# Patient Record
Sex: FEMALE | Race: WHITE | URBAN - NONMETROPOLITAN AREA
[De-identification: names, ages, dates, MRNs, and addresses within clinical notes are randomized per-mention and may not be internally consistent; named-entity substitution may affect disease eponyms.]

---

## 2017-04-11 ENCOUNTER — DOCTOR'S OFFICE (OUTPATIENT)
Dept: URBAN - NONMETROPOLITAN AREA CLINIC 1 | Facility: CLINIC | Age: 57
Setting detail: OPHTHALMOLOGY
End: 2017-04-11
Payer: COMMERCIAL

## 2017-04-11 DIAGNOSIS — H52.13: ICD-10-CM

## 2017-04-11 DIAGNOSIS — H52.4: ICD-10-CM

## 2017-04-11 DIAGNOSIS — H25.013: ICD-10-CM

## 2017-04-11 PROCEDURE — 92310 CONTACT LENS FITTING OU: CPT | Performed by: OPTOMETRIST

## 2017-04-11 PROCEDURE — 92014 COMPRE OPH EXAM EST PT 1/>: CPT | Performed by: OPTOMETRIST

## 2017-04-11 ASSESSMENT — REFRACTION_MANIFEST
OS_VA3: 20/
OD_VA3: 20/
OS_VA1: 20/
OU_VA: 20/
OU_VA: 20/
OD_VA1: 20/
OD_VA2: 20/
OD_VA3: 20/
OS_VA3: 20/
OD_VA1: 20/
OS_VA2: 20/
OS_VA2: 20/
OS_VA1: 20/
OD_VA2: 20/

## 2017-04-11 ASSESSMENT — REFRACTION_CURRENTRX
OD_OVR_VA: 20/
OD_ADD: +2.00
OS_SPHERE: -3.25
OS_OVR_VA: 20/
OD_AXIS: 148
OD_CYLINDER: -0.50
OD_VPRISM_DIRECTION: BF
OD_SPHERE: -2.75
OD_OVR_VA: 20/
OS_AXIS: 180
OS_OVR_VA: 20/
OS_ADD: +2.00
OS_VPRISM_DIRECTION: BF
OS_CYLINDER: 0.00
OS_OVR_VA: 20/
OD_OVR_VA: 20/

## 2017-04-11 ASSESSMENT — VISUAL ACUITY
OD_BCVA: 20/25
OS_BCVA: 20/20-1

## 2017-04-11 ASSESSMENT — CONFRONTATIONAL VISUAL FIELD TEST (CVF)
OS_FINDINGS: FULL
OD_FINDINGS: FULL

## 2017-04-11 ASSESSMENT — REFRACTION_OUTSIDERX
OD_SPHERE: -2.25
OD_AXIS: 135
OS_VA1: 20/20-2
OS_CYLINDER: -0.25
OS_VA3: 20/
OS_AXIS: 030
OU_VA: 20/
OS_SPHERE: -2.75
OD_VA3: 20/
OD_CYLINDER: -0.50
OS_VA2: 20/25+2
OD_ADD: +2.25
OD_VA2: 20/25+2
OD_VA1: 20/20-2
OS_ADD: +2.25

## 2017-04-11 ASSESSMENT — REFRACTION_AUTOREFRACTION
OD_SPHERE: -2.00
OS_AXIS: 28
OD_AXIS: 152
OD_CYLINDER: -0.75
OS_SPHERE: -2.50
OS_CYLINDER: -0.25

## 2017-04-11 ASSESSMENT — SPHEQUIV_DERIVED
OD_SPHEQUIV: -2.375
OS_SPHEQUIV: -2.625

## 2017-04-14 ENCOUNTER — OPTICAL OFFICE (OUTPATIENT)
Dept: URBAN - NONMETROPOLITAN AREA CLINIC 4 | Facility: CLINIC | Age: 57
Setting detail: OPHTHALMOLOGY
End: 2017-04-14
Payer: COMMERCIAL

## 2017-04-14 DIAGNOSIS — H52.223: ICD-10-CM

## 2017-04-14 PROCEDURE — V2020 VISION SVCS FRAMES PURCHASES: HCPCS | Performed by: OPTOMETRIST

## 2017-04-14 PROCEDURE — V2781 PROGRESSIVE LENS PER LENS: HCPCS | Performed by: OPTOMETRIST

## 2017-04-14 PROCEDURE — V2025 EYEGLASSES DELUX FRAMES: HCPCS | Performed by: OPTOMETRIST

## 2017-04-14 PROCEDURE — V2784 LENS POLYCARB OR EQUAL: HCPCS | Performed by: OPTOMETRIST

## 2017-04-14 PROCEDURE — V2203 LENS SPHCYL BIFOCAL 4.00D/.1: HCPCS | Performed by: OPTOMETRIST

## 2019-10-08 ENCOUNTER — DOCTOR'S OFFICE (OUTPATIENT)
Dept: URBAN - NONMETROPOLITAN AREA CLINIC 1 | Facility: CLINIC | Age: 59
Setting detail: OPHTHALMOLOGY
End: 2019-10-08
Payer: COMMERCIAL

## 2019-10-08 DIAGNOSIS — H52.4: ICD-10-CM

## 2019-10-08 DIAGNOSIS — H52.13: ICD-10-CM

## 2019-10-08 DIAGNOSIS — H25.013: ICD-10-CM

## 2019-10-08 PROCEDURE — 92310 CONTACT LENS FITTING OU: CPT | Performed by: OPTOMETRIST

## 2019-10-08 PROCEDURE — 92014 COMPRE OPH EXAM EST PT 1/>: CPT | Performed by: OPTOMETRIST

## 2019-10-08 ASSESSMENT — REFRACTION_MANIFEST
OD_CYLINDER: -0.25
OU_VA: 20/
OS_VA1: 20/
OS_VA2: 20/25+2
OS_CYLINDER: -0.25
OD_VA2: 20/
OS_VA2: 20/
OD_ADD: +2.25
OS_SPHERE: -2.00
OD_VA2: 20/25+2
OS_AXIS: 180
OD_VA1: 20/
OD_SPHERE: -1.50
OD_VA3: 20/
OS_VA3: 20/
OD_AXIS: 135
OS_VA1: 20/20-2
OS_ADD: +2.25
OD_VA3: 20/
OU_VA: 20/
OS_VA3: 20/
OD_VA1: 20/20-2

## 2019-10-08 ASSESSMENT — CONFRONTATIONAL VISUAL FIELD TEST (CVF)
OS_FINDINGS: FULL
OD_FINDINGS: FULL

## 2019-10-08 ASSESSMENT — SPHEQUIV_DERIVED
OS_SPHEQUIV: -2.125
OD_SPHEQUIV: -1.625

## 2019-12-10 ASSESSMENT — REFRACTION_CURRENTRX
OD_SPHERE: -2.25
OS_OVR_VA: 20/
OS_OVR_VA: 20/
OS_AXIS: 042
OD_OVR_VA: 20/
OS_CYLINDER: -0.25
OD_CYLINDER: -0.50
OS_OVR_VA: 20/
OD_OVR_VA: 20/
OD_AXIS: 132
OD_ADD: +2.25
OD_VPRISM_DIRECTION: PROGS
OS_VPRISM_DIRECTION: PROGS
OS_SPHERE: -2.75
OD_OVR_VA: 20/
OS_ADD: +2.25

## 2019-12-10 ASSESSMENT — SPHEQUIV_DERIVED
OS_SPHEQUIV: -2
OD_SPHEQUIV: -1.75

## 2019-12-10 ASSESSMENT — REFRACTION_AUTOREFRACTION
OD_SPHERE: -1.50
OD_AXIS: 128
OS_AXIS: 162
OS_CYLINDER: -0.50
OS_SPHERE: -1.75
OD_CYLINDER: -0.50

## 2019-12-10 ASSESSMENT — VISUAL ACUITY
OD_BCVA: 20/30-1
OS_BCVA: 20/25+1

## 2020-10-09 ENCOUNTER — OPTICAL OFFICE (OUTPATIENT)
Dept: URBAN - NONMETROPOLITAN AREA CLINIC 4 | Facility: CLINIC | Age: 60
Setting detail: OPHTHALMOLOGY
End: 2020-10-09
Payer: COMMERCIAL

## 2020-10-09 ENCOUNTER — DOCTOR'S OFFICE (OUTPATIENT)
Dept: URBAN - NONMETROPOLITAN AREA CLINIC 1 | Facility: CLINIC | Age: 60
Setting detail: OPHTHALMOLOGY
End: 2020-10-09
Payer: COMMERCIAL

## 2020-10-09 DIAGNOSIS — H52.02: ICD-10-CM

## 2020-10-09 DIAGNOSIS — H52.13: ICD-10-CM

## 2020-10-09 DIAGNOSIS — H52.11: ICD-10-CM

## 2020-10-09 DIAGNOSIS — H52.4: ICD-10-CM

## 2020-10-09 PROCEDURE — S0500 DISPOS CONT LENS: HCPCS | Performed by: OPTOMETRIST

## 2020-10-09 PROCEDURE — 92310 CONTACT LENS FITTING OU: CPT | Performed by: OPTOMETRIST

## 2020-10-09 PROCEDURE — 92014 COMPRE OPH EXAM EST PT 1/>: CPT | Performed by: OPTOMETRIST

## 2020-10-09 ASSESSMENT — REFRACTION_CURRENTRX
OD_OVR_VA: 20/
OS_VPRISM_DIRECTION: PROGS
OD_AXIS: 132
OD_ADD: +2.25
OS_ADD: +2.25
OS_AXIS: 042
OD_SPHERE: -2.25
OS_SPHERE: -2.75
OD_VPRISM_DIRECTION: PROGS
OS_CYLINDER: -0.25
OD_CYLINDER: -0.50
OS_OVR_VA: 20/

## 2020-10-09 ASSESSMENT — REFRACTION_MANIFEST
OD_VA1: 20/20-2
OD_CYLINDER: SPH
OS_VA2: 20/25+2
OS_AXIS: 180
OS_ADD: +2.50
OD_ADD: +2.50
OD_VA2: 20/25+2
OD_SPHERE: -1.75
OS_VA1: 20/20-2
OS_CYLINDER: -0.25
OS_SPHERE: -2.25

## 2020-10-09 ASSESSMENT — SPHEQUIV_DERIVED
OD_SPHEQUIV: -2
OS_SPHEQUIV: -2.375
OS_SPHEQUIV: -2.375

## 2020-10-09 ASSESSMENT — REFRACTION_AUTOREFRACTION
OS_SPHERE: -2.25
OS_AXIS: 129
OD_SPHERE: -2.00
OD_CYLINDER: 0.00
OD_AXIS: 180
OS_CYLINDER: -0.25

## 2020-10-09 ASSESSMENT — CONFRONTATIONAL VISUAL FIELD TEST (CVF)
OS_FINDINGS: FULL
OD_FINDINGS: FULL

## 2020-10-09 ASSESSMENT — VISUAL ACUITY
OD_BCVA: 20/30-1
OS_BCVA: 20/25

## 2021-09-28 ENCOUNTER — HOSPITAL ENCOUNTER (EMERGENCY)
Facility: HOSPITAL | Age: 61
Discharge: HOME | End: 2021-09-28
Attending: EMERGENCY MEDICINE
Payer: COMMERCIAL

## 2021-09-28 VITALS
HEIGHT: 69 IN | DIASTOLIC BLOOD PRESSURE: 105 MMHG | TEMPERATURE: 98 F | WEIGHT: 242.29 LBS | OXYGEN SATURATION: 97 % | BODY MASS INDEX: 35.89 KG/M2 | RESPIRATION RATE: 18 BRPM | SYSTOLIC BLOOD PRESSURE: 157 MMHG | HEART RATE: 94 BPM

## 2021-09-28 DIAGNOSIS — Z20.822 CONTACT WITH AND (SUSPECTED) EXPOSURE TO COVID-19: Primary | ICD-10-CM

## 2021-09-28 LAB — SARS-COV-2 RNA RESP QL NAA+PROBE: NEGATIVE

## 2021-09-28 PROCEDURE — 99281 EMR DPT VST MAYX REQ PHY/QHP: CPT | Performed by: EMERGENCY MEDICINE

## 2021-09-28 PROCEDURE — U0005 INFEC AGEN DETEC AMPLI PROBE: HCPCS | Performed by: EMERGENCY MEDICINE

## 2021-09-28 PROCEDURE — U0003 INFECTIOUS AGENT DETECTION BY NUCLEIC ACID (DNA OR RNA); SEVERE ACUTE RESPIRATORY SYNDROME CORONAVIRUS 2 (SARS-COV-2) (CORONAVIRUS DISEASE [COVID-19]), AMPLIFIED PROBE TECHNIQUE, MAKING USE OF HIGH THROUGHPUT TECHNOLOGIES AS DESCRIBED BY CMS-2020-01-R: HCPCS | Performed by: EMERGENCY MEDICINE

## 2021-09-28 PROCEDURE — 99282 EMERGENCY DEPT VISIT SF MDM: CPT

## 2022-02-16 ENCOUNTER — TELEPHONE (OUTPATIENT)
Dept: PREADMISSION TESTING | Facility: HOSPITAL | Age: 62
End: 2022-02-16

## 2022-02-16 VITALS — BODY MASS INDEX: 34.36 KG/M2 | HEIGHT: 69 IN | WEIGHT: 232 LBS

## 2022-02-16 RX ORDER — MULTIVIT-MIN/IRON FUM/FOLIC AC 7.5 MG-4
1 TABLET ORAL DAILY
COMMUNITY

## 2022-02-16 RX ORDER — AMLODIPINE BESYLATE 5 MG/1
5 TABLET ORAL DAILY
COMMUNITY

## 2022-02-16 RX ORDER — ESCITALOPRAM OXALATE 10 MG/1
10 TABLET ORAL DAILY
COMMUNITY

## 2022-02-16 NOTE — PRE-PROCEDURE INSTRUCTIONS
Pre-Surgery Instructions:   Medication Instructions    amLODIPine (NORVASC) 5 mg tablet Instructed patient per Anesthesia Guidelines   Ascorbic Acid (VITAMIN C ER PO) Instructed patient per Anesthesia Guidelines   escitalopram (LEXAPRO) 10 mg tablet Instructed patient per Anesthesia Guidelines   Misc Natural Products (GLUCOSAMINE CHOND CMP ADVANCED PO) Instructed patient per Anesthesia Guidelines   Multiple Vitamins-Minerals (multivitamin with minerals) tablet Instructed patient per Anesthesia Guidelines   Omega-3 Fatty Acids (FISH OIL BURP-LESS PO) Instructed patient per Anesthesia Guidelines  Pt was instructed to stop all vitamins/supplements from today 2/16 until after procedure    Pt will be taking amlodipine and lexapro the am of procedure

## 2022-02-17 ENCOUNTER — TELEPHONE (OUTPATIENT)
Dept: SURGERY | Facility: HOSPITAL | Age: 62
End: 2022-02-17

## 2022-02-18 ENCOUNTER — ANESTHESIA EVENT (OUTPATIENT)
Dept: GASTROENTEROLOGY | Facility: HOSPITAL | Age: 62
End: 2022-02-18

## 2022-02-18 ENCOUNTER — HOSPITAL ENCOUNTER (OUTPATIENT)
Dept: GASTROENTEROLOGY | Facility: HOSPITAL | Age: 62
Setting detail: OUTPATIENT SURGERY
Discharge: HOME | End: 2022-02-18
Attending: INTERNAL MEDICINE
Payer: COMMERCIAL

## 2022-02-18 ENCOUNTER — ANESTHESIA (OUTPATIENT)
Dept: GASTROENTEROLOGY | Facility: HOSPITAL | Age: 62
End: 2022-02-18

## 2022-02-18 VITALS
HEART RATE: 67 BPM | RESPIRATION RATE: 20 BRPM | HEIGHT: 69 IN | DIASTOLIC BLOOD PRESSURE: 82 MMHG | WEIGHT: 232 LBS | TEMPERATURE: 97.9 F | OXYGEN SATURATION: 100 % | SYSTOLIC BLOOD PRESSURE: 157 MMHG | BODY MASS INDEX: 34.36 KG/M2

## 2022-02-18 DIAGNOSIS — Z12.11 ENCOUNTER FOR SCREENING FOR MALIGNANT NEOPLASM OF COLON: ICD-10-CM

## 2022-02-18 PROCEDURE — 88305 TISSUE EXAM BY PATHOLOGIST: CPT | Performed by: PATHOLOGY

## 2022-02-18 RX ORDER — LIDOCAINE HYDROCHLORIDE 10 MG/ML
INJECTION, SOLUTION EPIDURAL; INFILTRATION; INTRACAUDAL; PERINEURAL AS NEEDED
Status: DISCONTINUED | OUTPATIENT
Start: 2022-02-18 | End: 2022-02-18

## 2022-02-18 RX ORDER — ONDANSETRON 2 MG/ML
4 INJECTION INTRAMUSCULAR; INTRAVENOUS ONCE AS NEEDED
Status: CANCELLED | OUTPATIENT
Start: 2022-02-18

## 2022-02-18 RX ORDER — PROPOFOL 10 MG/ML
INJECTION, EMULSION INTRAVENOUS CONTINUOUS PRN
Status: DISCONTINUED | OUTPATIENT
Start: 2022-02-18 | End: 2022-02-18

## 2022-02-18 RX ORDER — LIDOCAINE HYDROCHLORIDE 10 MG/ML
0.5 INJECTION, SOLUTION EPIDURAL; INFILTRATION; INTRACAUDAL; PERINEURAL ONCE AS NEEDED
Status: DISCONTINUED | OUTPATIENT
Start: 2022-02-18 | End: 2022-02-22 | Stop reason: HOSPADM

## 2022-02-18 RX ORDER — SODIUM CHLORIDE, SODIUM LACTATE, POTASSIUM CHLORIDE, CALCIUM CHLORIDE 600; 310; 30; 20 MG/100ML; MG/100ML; MG/100ML; MG/100ML
125 INJECTION, SOLUTION INTRAVENOUS CONTINUOUS
Status: DISCONTINUED | OUTPATIENT
Start: 2022-02-18 | End: 2022-02-22 | Stop reason: HOSPADM

## 2022-02-18 RX ORDER — DIPHENHYDRAMINE HYDROCHLORIDE 50 MG/ML
12.5 INJECTION INTRAMUSCULAR; INTRAVENOUS ONCE AS NEEDED
Status: CANCELLED | OUTPATIENT
Start: 2022-02-18

## 2022-02-18 RX ORDER — METOCLOPRAMIDE HYDROCHLORIDE 5 MG/ML
10 INJECTION INTRAMUSCULAR; INTRAVENOUS ONCE AS NEEDED
Status: CANCELLED | OUTPATIENT
Start: 2022-02-18

## 2022-02-18 RX ORDER — PROPOFOL 10 MG/ML
INJECTION, EMULSION INTRAVENOUS AS NEEDED
Status: DISCONTINUED | OUTPATIENT
Start: 2022-02-18 | End: 2022-02-18

## 2022-02-18 RX ORDER — GLYCOPYRROLATE 0.2 MG/ML
INJECTION INTRAMUSCULAR; INTRAVENOUS AS NEEDED
Status: DISCONTINUED | OUTPATIENT
Start: 2022-02-18 | End: 2022-02-18

## 2022-02-18 RX ORDER — LABETALOL 20 MG/4 ML (5 MG/ML) INTRAVENOUS SYRINGE
AS NEEDED
Status: DISCONTINUED | OUTPATIENT
Start: 2022-02-18 | End: 2022-02-18

## 2022-02-18 RX ADMIN — GLYCOPYRROLATE 0.4 MG: 0.2 INJECTION, SOLUTION INTRAMUSCULAR; INTRAVENOUS at 11:11

## 2022-02-18 RX ADMIN — PROPOFOL 120 MCG/KG/MIN: 10 INJECTION, EMULSION INTRAVENOUS at 11:05

## 2022-02-18 RX ADMIN — PROPOFOL 30 MG: 10 INJECTION, EMULSION INTRAVENOUS at 11:05

## 2022-02-18 RX ADMIN — LABETALOL HYDROCHLORIDE 5 MG: 5 INJECTION, SOLUTION INTRAVENOUS at 11:16

## 2022-02-18 RX ADMIN — SODIUM CHLORIDE, SODIUM LACTATE, POTASSIUM CHLORIDE, AND CALCIUM CHLORIDE: .6; .31; .03; .02 INJECTION, SOLUTION INTRAVENOUS at 10:56

## 2022-02-18 RX ADMIN — LABETALOL HYDROCHLORIDE 5 MG: 5 INJECTION, SOLUTION INTRAVENOUS at 11:36

## 2022-02-18 RX ADMIN — PROPOFOL 50 MG: 10 INJECTION, EMULSION INTRAVENOUS at 11:03

## 2022-02-18 RX ADMIN — LABETALOL HYDROCHLORIDE 5 MG: 5 INJECTION, SOLUTION INTRAVENOUS at 11:22

## 2022-02-18 RX ADMIN — LIDOCAINE HYDROCHLORIDE 50 MG: 10 INJECTION, SOLUTION EPIDURAL; INFILTRATION; INTRACAUDAL; PERINEURAL at 11:03

## 2022-02-18 RX ADMIN — LABETALOL HYDROCHLORIDE 5 MG: 5 INJECTION, SOLUTION INTRAVENOUS at 11:28

## 2022-02-18 NOTE — ANESTHESIA PREPROCEDURE EVALUATION
Procedure:  COLONOSCOPY    Relevant Problems   ANESTHESIA (within normal limits)      CARDIO   (+) Hypertension      ENDO (within normal limits)      GI/HEPATIC (within normal limits)      /RENAL (within normal limits)      GYN (within normal limits)      HEMATOLOGY (within normal limits)      MUSCULOSKELETAL (within normal limits)      NEURO/PSYCH   (+) Anxiety      PULMONARY   (+) Sleep apnea      Other   (+) Encounter for screening colonoscopy        Physical Exam    Airway    Mallampati score: II  TM Distance: >3 FB  Neck ROM: full     Dental   No notable dental hx     Cardiovascular  Rhythm: regular, Rate: normal, Cardiovascular exam normal    Pulmonary  Pulmonary exam normal Breath sounds clear to auscultation,     Other Findings        Anesthesia Plan  ASA Score- 2     Anesthesia Type- IV sedation with anesthesia with ASA Monitors  Additional Monitors:   Airway Plan:           Plan Factors-    Chart reviewed  Existing labs reviewed  Patient summary reviewed  Induction- intravenous  Postoperative Plan-     Informed Consent- Anesthetic plan and risks discussed with patient  I personally reviewed this patient with the CRNA  Discussed and agreed on the Anesthesia Plan with the NAKUL Collier

## 2022-02-18 NOTE — H&P
History and Physical - [facility] at [address] [place] [address] 62 y o  female MRN: [id number]                  HPI: Candida Mancuso is a 62y o  year old female who presents for colonoscopy for colon cancer screening  This is patient's index colonoscopy  She is at average risk for colon cancer  She denies any lower GI symptoms  She is not on any blood thinning agents  REVIEW OF SYSTEMS: Per the HPI, and otherwise unremarkable  Historical Information   Past Medical History:   Diagnosis Date    Anxiety     Encounter for screening colonoscopy     Hypertension     Sleep apnea     Wears contact lenses      Past Surgical History:   Procedure Laterality Date    HYSTERECTOMY      OVARIAN CYST REMOVAL      TONSILLECTOMY       Social History   Social History     Substance and Sexual Activity   Alcohol Use Yes    Comment: socially/weekly     Social History     Substance and Sexual Activity   Drug Use Never     Social History     Tobacco Use   Smoking Status Former Smoker    Types: Cigarettes   Smokeless Tobacco Never Used   Tobacco Comment    Pt smoked in college     No family history on file  Meds/Allergies     (Not in a hospital admission)      Allergies   Allergen Reactions    Nickel Itching     Pt states it is from jewelry  Objective     Blood pressure 145/87, pulse 72, temperature 97 8 °F (36 6 °C), temperature source Oral, resp  rate 20, height 5' 9" (1 753 m), weight 105 kg (232 lb), SpO2 97 %  PHYSICAL EXAM    Gen: NAD  CV: RRR  CHEST: Clear  ABD: soft, NT/ND  EXT: no edema      ASSESSMENT/PLAN:  This is a 62y o  year old female here for colonoscopy for colon cancer screening  Patient is stable and optimized for procedure      PLAN:   Procedure:  Colonoscopy

## 2022-02-18 NOTE — ANESTHESIA POSTPROCEDURE EVALUATION
Post-Op Assessment Note    CV Status:  Stable  Pain Score: 0    Pain management: adequate     Mental Status:  Arousable and sleepy   Hydration Status:  Euvolemic   PONV Controlled:  Controlled   Airway Patency:  Patent      Post Op Vitals Reviewed: Yes      Staff: CRNA         No complications documented      BP (P) 157/92 (02/18/22 1230)    Temp (!) (P) 97 °F (36 1 °C) (02/18/22 1230)    Pulse (P) 74 (02/18/22 1230)   Resp (P) 20 (02/18/22 1230)    SpO2 (P) 99 % (02/18/22 1230)

## 2022-10-11 ENCOUNTER — OPTICAL OFFICE (OUTPATIENT)
Dept: URBAN - NONMETROPOLITAN AREA CLINIC 4 | Facility: CLINIC | Age: 62
Setting detail: OPHTHALMOLOGY
End: 2022-10-11
Payer: COMMERCIAL

## 2022-10-11 ENCOUNTER — DOCTOR'S OFFICE (OUTPATIENT)
Dept: URBAN - NONMETROPOLITAN AREA CLINIC 1 | Facility: CLINIC | Age: 62
Setting detail: OPHTHALMOLOGY
End: 2022-10-11
Payer: COMMERCIAL

## 2022-10-11 DIAGNOSIS — H52.4: ICD-10-CM

## 2022-10-11 DIAGNOSIS — H52.13: ICD-10-CM

## 2022-10-11 PROBLEM — H25.013 CORTICAL SENILE CATARACT; BOTH EYES: Status: ACTIVE | Noted: 2017-04-11

## 2022-10-11 PROCEDURE — 92014 COMPRE OPH EXAM EST PT 1/>: CPT | Performed by: OPTOMETRIST

## 2022-10-11 PROCEDURE — V2025 EYEGLASSES DELUX FRAMES: HCPCS | Performed by: OPTOMETRIST

## 2022-10-11 PROCEDURE — V2781 PROGRESSIVE LENS PER LENS: HCPCS | Performed by: OPTOMETRIST

## 2022-10-11 PROCEDURE — V2020 VISION SVCS FRAMES PURCHASES: HCPCS | Performed by: OPTOMETRIST

## 2022-10-11 ASSESSMENT — SPHEQUIV_DERIVED
OS_SPHEQUIV: -2.375
OS_SPHEQUIV: -2.25
OD_SPHEQUIV: -1.875

## 2022-10-11 ASSESSMENT — REFRACTION_MANIFEST
OS_ADD: +2.50
OD_SPHERE: -1.75
OD_CYLINDER: SPH
OD_ADD: +2.50
OS_CYLINDER: -0.25
OD_VA2: 20/25+2
OS_VA2: 20/25+2
OS_VA1: 20/20-2
OD_VA1: 20/20-2
OS_SPHERE: -2.25
OS_AXIS: 180

## 2022-10-11 ASSESSMENT — REFRACTION_CURRENTRX
OD_SPHERE: -2.25
OS_AXIS: 042
OS_CYLINDER: -0.25
OD_ADD: +2.25
OS_ADD: +2.25
OS_VPRISM_DIRECTION: PROGS
OD_OVR_VA: 20/
OS_SPHERE: -2.75
OS_OVR_VA: 20/
OD_VPRISM_DIRECTION: PROGS
OD_AXIS: 132
OD_CYLINDER: -0.50

## 2022-10-11 ASSESSMENT — CONFRONTATIONAL VISUAL FIELD TEST (CVF)
OS_FINDINGS: FULL
OD_FINDINGS: FULL

## 2022-10-11 ASSESSMENT — TONOMETRY
OD_IOP_MMHG: 14
OS_IOP_MMHG: 14

## 2022-10-11 ASSESSMENT — REFRACTION_AUTOREFRACTION
OD_AXIS: 179
OS_CYLINDER: 0.00
OD_CYLINDER: -0.25
OD_SPHERE: -1.75
OS_SPHERE: -2.25
OS_AXIS: 129

## 2022-10-11 ASSESSMENT — VISUAL ACUITY
OS_BCVA: 20/30
OD_BCVA: 20/30-1

## 2023-04-19 ENCOUNTER — APPOINTMENT (EMERGENCY)
Dept: RADIOLOGY | Facility: HOSPITAL | Age: 63
End: 2023-04-19

## 2023-04-19 ENCOUNTER — HOSPITAL ENCOUNTER (EMERGENCY)
Facility: HOSPITAL | Age: 63
Discharge: HOME | End: 2023-04-19
Attending: EMERGENCY MEDICINE | Admitting: EMERGENCY MEDICINE

## 2023-04-19 VITALS
DIASTOLIC BLOOD PRESSURE: 110 MMHG | WEIGHT: 230 LBS | RESPIRATION RATE: 16 BRPM | BODY MASS INDEX: 33.97 KG/M2 | TEMPERATURE: 97 F | SYSTOLIC BLOOD PRESSURE: 170 MMHG | OXYGEN SATURATION: 98 % | HEART RATE: 78 BPM

## 2023-04-19 DIAGNOSIS — S62.609A FINGER FRACTURE, LEFT: Primary | ICD-10-CM

## 2023-04-19 NOTE — ED PROVIDER NOTES
History  Chief Complaint   Patient presents with   • Hand Injury     Pt reports she tripped and fell, injuring L hand on Monday  Pain, swelling present  65year old female presents to the ED evaluation of left hand pain  Patient states she fell on Monday causing the left fingers to bend back  No other injury  Reports swelling and bruising  Reports decreased range of motion due to pain  Pain mainly at the base of the left ring finger  Abrasion on both knees  Tetanus up-to-date  No knee pain  Ambulating without difficulty  No head strike or LOC  History provided by:  Patient  Hand Pain  Location:  Left hand  Severity:  Moderate  Onset quality:  Sudden  Timing:  Constant  Progression:  Unchanged  Chronicity:  New  Context:  Bent fingers backwards       Prior to Admission Medications   Prescriptions Last Dose Informant Patient Reported? Taking? Ascorbic Acid (VITAMIN C ER PO)   Yes No   Sig: Take 1 tablet by mouth in the morning   Misc Natural Products (GLUCOSAMINE CHOND CMP ADVANCED PO)   Yes No   Sig: Take 1 tablet by mouth in the morning   Multiple Vitamins-Minerals (multivitamin with minerals) tablet   Yes No   Sig: Take 1 tablet by mouth daily   Omega-3 Fatty Acids (FISH OIL BURP-LESS PO)   Yes No   Sig: Take 1 capsule by mouth in the morning   amLODIPine (NORVASC) 5 mg tablet   Yes No   Sig: Take 5 mg by mouth daily   escitalopram (LEXAPRO) 10 mg tablet   Yes No   Sig: Take 10 mg by mouth daily      Facility-Administered Medications: None       Past Medical History:   Diagnosis Date   • Anxiety    • Encounter for screening colonoscopy    • Hypertension    • Sleep apnea    • Wears contact lenses        Past Surgical History:   Procedure Laterality Date   • HYSTERECTOMY     • OVARIAN CYST REMOVAL     • TONSILLECTOMY         History reviewed  No pertinent family history  I have reviewed and agree with the history as documented      E-Cigarette/Vaping   • E-Cigarette Use Never User E-Cigarette/Vaping Substances     Social History     Tobacco Use   • Smoking status: Former     Types: Cigarettes   • Smokeless tobacco: Never   • Tobacco comments:     Pt smoked in college   Vaping Use   • Vaping Use: Never used   Substance Use Topics   • Alcohol use: Yes     Comment: socially/weekly   • Drug use: Never       Review of Systems   Respiratory: Negative  Cardiovascular: Negative  Musculoskeletal:        Left hand pain and swelling   Skin: Positive for color change  All other systems reviewed and are negative  Physical Exam  Physical Exam  Vitals and nursing note reviewed  Constitutional:       General: She is not in acute distress  Appearance: Normal appearance  She is not ill-appearing, toxic-appearing or diaphoretic  HENT:      Head: Normocephalic  Eyes:      Conjunctiva/sclera: Conjunctivae normal    Musculoskeletal:         General: Swelling and tenderness present  Comments: Has swelling throughout the distal aspect of the left hand into the fingers  Mainly swollen in the left ring finger  Associated bruising  Decreased range of motion in fingers 2 through 5 due to swelling  Skin:     General: Skin is warm and dry  Findings: Bruising (left hand, left ring finger) present  Comments: Small abrasion bilateral knees   Neurological:      General: No focal deficit present  Mental Status: She is alert     Psychiatric:         Mood and Affect: Mood normal          Vital Signs  ED Triage Vitals [04/19/23 1613]   Temperature Pulse Respirations Blood Pressure SpO2   (!) 97 °F (36 1 °C) 78 16 (!) 170/110 98 %      Temp src Heart Rate Source Patient Position - Orthostatic VS BP Location FiO2 (%)   -- -- Lying Right arm --      Pain Score       2           Vitals:    04/19/23 1613   BP: (!) 170/110   Pulse: 78   Patient Position - Orthostatic VS: Lying         Visual Acuity      ED Medications  Medications - No data to display    Diagnostic Studies  Results "Reviewed     None                 XR hand 3+ views LEFT    (Results Pending)              Procedures  Splint application    Date/Time: 4/19/2023 4:47 PM  Performed by: Kp Carrasco PA-C  Authorized by: Kp Carrasco PA-C   [facility] Protocol:  Risks and benefits: risks, benefits and alternatives were discussed  Consent given by: patient  Time out: Immediately prior to procedure a \"time out\" was called to verify the correct patient, procedure, equipment, support staff and site/side marked as required  Timeout called at: 4/19/2023 4:47 PM   Patient understanding: patient states understanding of the procedure being performed  Patient consent: the patient's understanding of the procedure matches consent given  Radiology Images displayed and confirmed  If images not available, report reviewed: imaging studies available  Patient identity confirmed: verbally with patient      Pre-procedure details:     Sensation:  Normal  Procedure details:     Laterality:  Left    Location:  Finger    Finger:  L ring finger    Splint type:  Finger splint, static  Post-procedure details:     Pain:  Unchanged    Sensation:  Normal    Patient tolerance of procedure: Tolerated well, no immediate complications             ED Course  ED Course as of 04/19/23 1933   Wed Apr 19, 2023   1639 ED interpretation of x-rays concerning for Small chip fracture at the fourth MCP  Patient was splinted  We will follow-up with orthopedics                     SBIRT 20yo+    Flowsheet Row Most Recent Value   Initial Alcohol Screen: US AUDIT-C     1  How often do you have a drink containing alcohol? 0 Filed at: 04/19/2023 1618   2  How many drinks containing alcohol do you have on a typical day you are drinking? 0 Filed at: 04/19/2023 1618   3a  Male UNDER 65: How often do you have five or more drinks on one occasion? 0 Filed at: 04/19/2023 1618   3b  FEMALE Any Age, or MALE 65+: How often do you have 4 or more drinks on one occassion?  0 Filed at: " 04/19/2023 1618   Audit-C Score 0 Filed at: 04/19/2023 1618   CODY: How many times in the past year have you    Used an illegal drug or used a prescription medication for non-medical reasons? Never Filed at: 04/19/2023 1618                    Medical Decision Making  16-year-old female presents emergency department for evaluation of left hand pain status post fall on Monday  Patient states she tripped and fell causing her fingers to bend backwards  Vitals and medical record reviewed  Differential diagnosis included but not limited to fracture, contusion, sprain  ED interpretation of x-ray was concerning for a fracture, patient was placed in a splint and will follow-up with orthopedics  Return precautions discussed and she verbalized understanding  Clinically and hemodynamically stable for discharge    Finger fracture, left: acute illness or injury  Amount and/or Complexity of Data Reviewed  Radiology: ordered  Disposition  Final diagnoses:   Finger fracture, left     Time reflects when diagnosis was documented in both MDM as applicable and the Disposition within this note     Time User Action Codes Description Comment    4/19/2023  4:40 PM Brittni Oliveira Add [[id number] [id number]] Finger fracture, left       ED Disposition     ED Disposition   Discharge    Condition   Stable    Date/Time   Wed Apr 19, 2023  4:39 PM    Comment   Ailyn Alcazar discharge to home/self care                 Follow-up Information     Follow up With Specialties Details Why [address] Karlos Flores MD [address] Orthopedic Surgery   [address]  [address]  [address] [place] [address] [phone]            Discharge Medication List as of 4/19/2023  4:41 PM      CONTINUE these medications which have NOT CHANGED    Details   amLODIPine (NORVASC) 5 mg tablet Take 5 mg by mouth daily, Historical Med      Ascorbic Acid (VITAMIN C ER PO) Take 1 tablet by mouth in the morning, Historical Med      escitalopram (LEXAPRO) 10 mg tablet Take 10 mg by mouth daily, Historical Med      Misc Natural Products (GLUCOSAMINE CHOND CMP ADVANCED PO) Take 1 tablet by mouth in the morning, Historical Med      Multiple Vitamins-Minerals (multivitamin with minerals) tablet Take 1 tablet by mouth daily, Historical Med      Omega-3 Fatty Acids (FISH OIL BURP-LESS PO) Take 1 capsule by mouth in the morning, Historical Med                 PDMP Review     None          ED Provider  Electronically Signed by           Jorge Limon PA-C  04/19/23 1933

## 2023-04-19 NOTE — DISCHARGE INSTRUCTIONS
Please use finger brace as we discussed  Please follow up with orthopedics   Return with new or worsening symptoms

## 2023-04-26 ENCOUNTER — OFFICE VISIT (OUTPATIENT)
Dept: OBGYN CLINIC | Facility: CLINIC | Age: 63
End: 2023-04-26

## 2023-04-26 ENCOUNTER — HOSPITAL ENCOUNTER (OUTPATIENT)
Dept: RADIOLOGY | Facility: CLINIC | Age: 63
Discharge: HOME | End: 2023-04-26

## 2023-04-26 VITALS
BODY MASS INDEX: 35.55 KG/M2 | DIASTOLIC BLOOD PRESSURE: 88 MMHG | SYSTOLIC BLOOD PRESSURE: 135 MMHG | HEIGHT: 69 IN | HEART RATE: 80 BPM | TEMPERATURE: 97.6 F | WEIGHT: 240 LBS

## 2023-04-26 DIAGNOSIS — M79.645 FINGER PAIN, LEFT: Primary | ICD-10-CM

## 2023-04-26 DIAGNOSIS — S62.645A CLOSED NONDISPLACED FRACTURE OF PROXIMAL PHALANX OF LEFT RING FINGER, INITIAL ENCOUNTER: ICD-10-CM

## 2023-05-10 ENCOUNTER — HOSPITAL ENCOUNTER (OUTPATIENT)
Dept: RADIOLOGY | Facility: CLINIC | Age: 63
Discharge: HOME | End: 2023-05-10

## 2023-05-10 ENCOUNTER — OFFICE VISIT (OUTPATIENT)
Dept: OBGYN CLINIC | Facility: CLINIC | Age: 63
End: 2023-05-10

## 2023-05-10 VITALS
TEMPERATURE: 97.8 F | SYSTOLIC BLOOD PRESSURE: 140 MMHG | WEIGHT: 240 LBS | HEIGHT: 69 IN | DIASTOLIC BLOOD PRESSURE: 85 MMHG | BODY MASS INDEX: 35.55 KG/M2 | HEART RATE: 100 BPM

## 2023-05-10 DIAGNOSIS — S62.645D CLOSED NONDISPLACED FRACTURE OF PROXIMAL PHALANX OF LEFT RING FINGER WITH ROUTINE HEALING, SUBSEQUENT ENCOUNTER: ICD-10-CM

## 2023-05-10 DIAGNOSIS — S62.645D CLOSED NONDISPLACED FRACTURE OF PROXIMAL PHALANX OF LEFT RING FINGER WITH ROUTINE HEALING, SUBSEQUENT ENCOUNTER: Primary | ICD-10-CM

## 2023-05-10 NOTE — PROGRESS NOTES
ASSESSMENT/PLAN:    Diagnoses and all orders for this visit:    Closed nondisplaced fracture of proximal phalanx of left ring finger with routine healing, subsequent encounter  -     XR finger left fourth digit-ring; Future    Discussed with patient that she is progressing well post injury  Reviewed her x-rays at time of visit  She can now advance to removing the buddy loops during rest and for hygiene purposes, but she can keep them in place when out and about  She is progressing very well with her range of motion, and will likely not need formal hand therapy at this time  She was instructed in stretching and range of motion techniques at the time of visit  If she does feel she would likely benefit of formal hand therapy, she can contact the office and a referral will be provided  Continue OTC NSAIDs/Tylenol as needed for pain and soreness  She will be seen for follow-up in 3 weeks at which time we will repeat final x-rays of the left ring finger  Patient expresses understanding and is in agreement with this treatment plan  To Do Next Visit:  Reexamination of current issue, repeat x-rays left ring finger    _____________________________________________________  CHIEF COMPLAINT:  Chief Complaint   Patient presents with   • Left Ring Finger - Follow-up   • Follow-up         SUBJECTIVE:  Vitaly Godinez is a 65y o  year old female who presents for follow up of left ring finger proximal phalanx fracture  She is now 3 weeks post injury  Since last visit, Vitaly Godinez has tried buddy taping, OTC Tylenol/NSAIDs, and self-guided gentle range of motion  Today she reports mild pain with activity, but notes improvement in her overall range of motion and stiffness  She denies any numbness or tingling  She reports that she has been consistent with use of the provided buddy taping loops      PAST MEDICAL HISTORY:  Past Medical History:   Diagnosis Date   • Anxiety    • Encounter for screening colonoscopy    • Hypertension • Sleep apnea    • Wears contact lenses        PAST SURGICAL HISTORY:  Past Surgical History:   Procedure Laterality Date   • HYSTERECTOMY     • OVARIAN CYST REMOVAL     • TONSILLECTOMY         FAMILY HISTORY:  History reviewed  No pertinent family history  SOCIAL HISTORY:  Social History     Tobacco Use   • Smoking status: Former     Types: Cigarettes   • Smokeless tobacco: Never   • Tobacco comments:     Pt smoked in college   Vaping Use   • Vaping Use: Never used   Substance Use Topics   • Alcohol use: Yes     Comment: socially/weekly   • Drug use: Never       MEDICATIONS:    Current Outpatient Medications:   •  amLODIPine (NORVASC) 5 mg tablet, Take 5 mg by mouth daily, Disp: , Rfl:   •  Ascorbic Acid (VITAMIN C ER PO), Take 1 tablet by mouth in the morning, Disp: , Rfl:   •  escitalopram (LEXAPRO) 10 mg tablet, Take 10 mg by mouth daily, Disp: , Rfl:   •  Misc Natural Products (GLUCOSAMINE CHOND CMP ADVANCED PO), Take 1 tablet by mouth in the morning, Disp: , Rfl:   •  Multiple Vitamins-Minerals (multivitamin with minerals) tablet, Take 1 tablet by mouth daily, Disp: , Rfl:   •  Omega-3 Fatty Acids (FISH OIL BURP-LESS PO), Take 1 capsule by mouth in the morning, Disp: , Rfl:     ALLERGIES:  Allergies   Allergen Reactions   • Nickel Itching     Pt states it is from jewelry  REVIEW OF SYSTEMS:  Pertinent items are noted in HPI    A comprehensive review of systems was negative       _____________________________________________________  PHYSICAL EXAMINATION:  General: well developed and well nourished, alert, oriented times 3 and appears comfortable  Psychiatric: Normal  HEENT: Benign  Cardiovascular: Normal rate  Pulmonary: No wheezing or stridor  Skin: No masses, erythema, lacerations, fluctation, ulcerations  Neurovascular: Palpable distal pulses    MUSCULOSKELETAL EXAMINATION:  Patient presents with no obvious anatomical deformity  Skin is warm and dry to touch with no signs of erythema, ecchymosis, infection  No soft tissue swelling or effusion noted   Full FDS, FDP, extensor mechanisms are intact   Is able to perform composite finger flexion to within 2 mm of the distal palmar crease  No rotational deformity with composite finger flexion  Mildly tender to palpation over base of proximal phalanx  Demonstrates normal wrist, elbow, and shoulder motion  Forearm compartments are soft and supple  2+ distal radial pulse with brisk capillary refill to the fingers  Radial, median, and ulnar motor and sensory distributions intact  Sensation light touch intact distally    _____________________________________________________  STUDIES REVIEWED:  Attending Physician has personally reviewed pertinent imaging in PACS, impression is as follows:  Review of radiographic series taken 5/10/2023 of the left hand/ring finger shows nondisplaced fracture of base of proximal phalanx of the ring finger with maintained alignment as compared to previous imaging, and observable callus formation consistent with routine healing              Scribe Attestation    I,:  Edi Logan am acting as a scribe while in the presence of the attending physician :       I,:  Michael Ortega personally performed the services described in this documentation    as scribed in my presence :

## 2023-06-02 ENCOUNTER — OFFICE VISIT (OUTPATIENT)
Dept: OBGYN CLINIC | Facility: CLINIC | Age: 63
End: 2023-06-02

## 2023-06-02 ENCOUNTER — HOSPITAL ENCOUNTER (OUTPATIENT)
Dept: RADIOLOGY | Facility: CLINIC | Age: 63
End: 2023-06-02
Payer: COMMERCIAL

## 2023-06-02 VITALS
TEMPERATURE: 97.4 F | WEIGHT: 240 LBS | DIASTOLIC BLOOD PRESSURE: 80 MMHG | HEART RATE: 83 BPM | BODY MASS INDEX: 35.55 KG/M2 | SYSTOLIC BLOOD PRESSURE: 120 MMHG | HEIGHT: 69 IN

## 2023-06-02 DIAGNOSIS — S62.645D CLOSED NONDISPLACED FRACTURE OF PROXIMAL PHALANX OF LEFT RING FINGER WITH ROUTINE HEALING, SUBSEQUENT ENCOUNTER: ICD-10-CM

## 2023-06-02 DIAGNOSIS — S62.645D CLOSED NONDISPLACED FRACTURE OF PROXIMAL PHALANX OF LEFT RING FINGER WITH ROUTINE HEALING, SUBSEQUENT ENCOUNTER: Primary | ICD-10-CM

## 2023-06-02 PROCEDURE — 73140 X-RAY EXAM OF FINGER(S): CPT

## 2023-06-02 NOTE — PROGRESS NOTES
ASSESSMENT/PLAN:    Diagnoses and all orders for this visit:    Closed nondisplaced fracture of proximal phalanx of left ring finger with routine healing, subsequent encounter  -     XR finger left fourth digit-ring; Future        Plan: I would recommend follow-up as needed  She is doing well at this time  She has excellent range of motion although she does lack some terminal flexion and extension of the PIP joint  However, she has motion that would allow her to perform all of her normal daily activities  She is to continue the home exercise program   I have encouraged her to contact me if questions or concerns arise  Return if symptoms worsen or fail to improve       _____________________________________________________  CHIEF COMPLAINT:  Chief Complaint   Patient presents with   • Follow-up         SUBJECTIVE:  Mary Brown is a 65y o  year old female who presents for follow up of her left ring finger proximal phalanx fracture  She is doing well at this time  She does still note some slight limitation of motion but she denies pain  She denies any paresthesias  Oswaldo Nelson PAST MEDICAL HISTORY:  Past Medical History:   Diagnosis Date   • Anxiety    • Encounter for screening colonoscopy    • Hypertension    • Sleep apnea    • Wears contact lenses        PAST SURGICAL HISTORY:  Past Surgical History:   Procedure Laterality Date   • HYSTERECTOMY     • OVARIAN CYST REMOVAL     • TONSILLECTOMY         FAMILY HISTORY:  History reviewed  No pertinent family history      SOCIAL HISTORY:  Social History     Tobacco Use   • Smoking status: Former     Types: Cigarettes   • Smokeless tobacco: Never   • Tobacco comments:     Pt smoked in college   Vaping Use   • Vaping Use: Never used   Substance Use Topics   • Alcohol use: Yes     Comment: socially/weekly   • Drug use: Never       MEDICATIONS:    Current Outpatient Medications:   •  amLODIPine (NORVASC) 5 mg tablet, Take 5 mg by mouth daily, Disp: , Rfl:   •  Ascorbic Acid (VITAMIN C ER PO), Take 1 tablet by mouth in the morning, Disp: , Rfl:   •  escitalopram (LEXAPRO) 10 mg tablet, Take 10 mg by mouth daily, Disp: , Rfl:   •  Misc Natural Products (GLUCOSAMINE CHOND CMP ADVANCED PO), Take 1 tablet by mouth in the morning, Disp: , Rfl:   •  Multiple Vitamins-Minerals (multivitamin with minerals) tablet, Take 1 tablet by mouth daily, Disp: , Rfl:   •  Omega-3 Fatty Acids (FISH OIL BURP-LESS PO), Take 1 capsule by mouth in the morning, Disp: , Rfl:     ALLERGIES:  Allergies   Allergen Reactions   • Nickel Itching     Pt states it is from jewelry  REVIEW OF SYSTEMS:  Pertinent items are noted in HPI  A comprehensive review of systems was negative       _____________________________________________________  PHYSICAL EXAMINATION:  General: well developed and well nourished, alert and appears comfortable  Psychiatric: Normal  HEENT:  Normocephalic, atraumatic  Cardiovascular:  Regular  Pulmonary: No wheezing or stridor  Skin: No masses, erthema, lacerations, fluctation, ulcerations  Neurovascular: Motor and sensory exams are intact and pulses palpable  MUSCULOSKELETAL EXAMINATION:    The ring finger exam demonstrates no tenderness over the proximal phalanx  She does lack a few degrees of terminal flexion of the PIP joint but has good motion of the MCP and DIP joints  There is no rotational or angular malalignment noted  _____________________________________________________  STUDIES REVIEWED:  X-rays demonstrate healing of the fracture            Saadmason

## 2023-07-07 DIAGNOSIS — S62.645D CLOSED NONDISPLACED FRACTURE OF PROXIMAL PHALANX OF LEFT RING FINGER WITH ROUTINE HEALING, SUBSEQUENT ENCOUNTER: Primary | ICD-10-CM

## 2023-08-24 ENCOUNTER — TELEPHONE (OUTPATIENT)
Age: 63
End: 2023-08-24

## 2023-08-24 NOTE — TELEPHONE ENCOUNTER
Faxed most recent script to [facility] to fax number given in prior encounter note.     Fax #: [phone]

## 2023-08-24 NOTE — TELEPHONE ENCOUNTER
Caller: Patient    Doctor: Félix Epps    Reason for call: Would like to receive PT @[facility]. Please fax script for treatment to [address] [place] [address]. [facility]     Call back#: [phone]

## 2024-04-10 DIAGNOSIS — Z12.31 ENCOUNTER FOR SCREENING MAMMOGRAM FOR MALIGNANT NEOPLASM OF BREAST: ICD-10-CM

## 2024-10-14 ENCOUNTER — OPTICAL OFFICE (OUTPATIENT)
Dept: URBAN - NONMETROPOLITAN AREA CLINIC 4 | Facility: CLINIC | Age: 64
Setting detail: OPHTHALMOLOGY
End: 2024-10-14

## 2024-10-14 ENCOUNTER — OPTICAL OFFICE (OUTPATIENT)
Dept: URBAN - NONMETROPOLITAN AREA CLINIC 4 | Facility: CLINIC | Age: 64
Setting detail: OPHTHALMOLOGY
End: 2024-10-14
Payer: COMMERCIAL

## 2024-10-14 ENCOUNTER — DOCTOR'S OFFICE (OUTPATIENT)
Dept: URBAN - NONMETROPOLITAN AREA CLINIC 1 | Facility: CLINIC | Age: 64
Setting detail: OPHTHALMOLOGY
End: 2024-10-14
Payer: COMMERCIAL

## 2024-10-14 DIAGNOSIS — H52.13: ICD-10-CM

## 2024-10-14 DIAGNOSIS — H52.4: ICD-10-CM

## 2024-10-14 PROBLEM — H25.13 CATARACT NUCLEAR SCLEROSIS; BOTH EYES: Status: ACTIVE | Noted: 2024-10-14

## 2024-10-14 PROBLEM — H43.393 VITREOUS FLOATERS; BOTH EYES: Status: ACTIVE | Noted: 2024-10-14

## 2024-10-14 PROCEDURE — V2762 POLARIZATION, ANY LENS: HCPCS | Performed by: OPTOMETRIST

## 2024-10-14 PROCEDURE — V2203 LENS SPHCYL BIFOCAL 4.00D/.1: HCPCS | Mod: LT | Performed by: OPTOMETRIST

## 2024-10-14 PROCEDURE — S0500 DISPOS CONT LENS: HCPCS | Mod: LT | Performed by: OPTOMETRIST

## 2024-10-14 PROCEDURE — V2781 PROGRESSIVE LENS PER LENS: HCPCS | Performed by: OPTOMETRIST

## 2024-10-14 PROCEDURE — V2762 POLARIZATION, ANY LENS: HCPCS | Mod: LT | Performed by: OPTOMETRIST

## 2024-10-14 PROCEDURE — V2781 PROGRESSIVE LENS PER LENS: HCPCS | Mod: LT | Performed by: OPTOMETRIST

## 2024-10-14 PROCEDURE — V2200 LENS SPHER BIFOC PLANO 4.00D: HCPCS | Mod: RT | Performed by: OPTOMETRIST

## 2024-10-14 PROCEDURE — S0500 DISPOS CONT LENS: HCPCS | Mod: RT | Performed by: OPTOMETRIST

## 2024-10-14 PROCEDURE — V2020 VISION SVCS FRAMES PURCHASES: HCPCS | Performed by: OPTOMETRIST

## 2024-10-14 PROCEDURE — 92014 COMPRE OPH EXAM EST PT 1/>: CPT | Performed by: OPTOMETRIST

## 2024-10-14 ASSESSMENT — REFRACTION_AUTOREFRACTION
OD_CYLINDER: 0.00
OD_SPHERE: -1.25
OS_SPHERE: -1.75
OS_CYLINDER: -0.50
OS_AXIS: 124

## 2024-10-14 ASSESSMENT — TONOMETRY
OD_IOP_MMHG: 14
OS_IOP_MMHG: 14

## 2024-10-14 ASSESSMENT — REFRACTION_CURRENTRX
OD_ADD: +2.50
OS_OVR_VA: 20/
OS_SPHERE: -2.25
OD_OVR_VA: 20/
OD_CYLINDER: SPH
OS_ADD: +2.50
OS_VPRISM_DIRECTION: PROGS
OD_SPHERE: -1.75
OD_VPRISM_DIRECTION: PROGS
OS_AXIS: 180
OS_CYLINDER: -0.25

## 2024-10-14 ASSESSMENT — REFRACTION_MANIFEST
OD_CYLINDER: SPH
OD_VA2: 20/25+2
OS_SPHERE: -1.75
OS_CYLINDER: -0.25
OS_VA1: 20/20-2
OS_ADD: +2.50
OD_SPHERE: -1.50
OS_VA2: 20/25+2
OS_AXIS: 180
OD_VA1: 20/20-2
OD_ADD: +2.50

## 2024-10-14 ASSESSMENT — VISUAL ACUITY
OS_BCVA: 20/25
OD_BCVA: 20/30-1

## 2024-10-14 ASSESSMENT — CONFRONTATIONAL VISUAL FIELD TEST (CVF)
OD_FINDINGS: FULL
OS_FINDINGS: FULL

## 2025-04-21 ENCOUNTER — ANESTHESIA EVENT (OUTPATIENT)
Dept: ANESTHESIOLOGY | Facility: HOSPITAL | Age: 65
End: 2025-04-21

## 2025-04-21 ENCOUNTER — ANESTHESIA (OUTPATIENT)
Dept: ANESTHESIOLOGY | Facility: HOSPITAL | Age: 65
End: 2025-04-21

## 2025-04-21 NOTE — ANESTHESIA PREPROCEDURE EVALUATION
Procedure:  PRE-OP ONLY    Relevant Problems   CARDIO   (+) Hypertension      NEURO/PSYCH   (+) Anxiety      PULMONARY   (+) Sleep apnea        Physical Exam    Airway    Mallampati score: II  TM Distance: >3 FB  Neck ROM: full     Dental   No notable dental hx     Cardiovascular  Cardiovascular exam normal    Pulmonary  Pulmonary exam normal     Other Findings  post-pubertal.      Anesthesia Plan  ASA Score- 2     Anesthesia Type- IV sedation with anesthesia with ASA Monitors.         Additional Monitors:     Airway Plan:            Plan Factors-Exercise tolerance (METS): >4 METS.    Chart reviewed.  Imaging results reviewed. Existing labs reviewed. Patient summary reviewed.    Patient is not a current smoker.      Obstructive sleep apnea risk education given perioperatively.        Induction- intravenous.    Postoperative Plan-     Perioperative Resuscitation Plan - Level 1 - Full Code.       Informed Consent-       NPO Status:  No vitals data found for the desired time range.

## 2025-04-22 ENCOUNTER — ANESTHESIA EVENT (OUTPATIENT)
Dept: GASTROENTEROLOGY | Facility: HOSPITAL | Age: 65
End: 2025-04-22
Payer: COMMERCIAL

## 2025-04-22 ENCOUNTER — HOSPITAL ENCOUNTER (OUTPATIENT)
Dept: GASTROENTEROLOGY | Facility: HOSPITAL | Age: 65
Setting detail: OUTPATIENT SURGERY
Discharge: HOME | End: 2025-04-22
Attending: HOSPITALIST
Payer: COMMERCIAL

## 2025-04-22 ENCOUNTER — ANESTHESIA (OUTPATIENT)
Dept: GASTROENTEROLOGY | Facility: HOSPITAL | Age: 65
End: 2025-04-22
Payer: COMMERCIAL

## 2025-04-22 VITALS
RESPIRATION RATE: 20 BRPM | TEMPERATURE: 98.8 F | WEIGHT: 232 LBS | BODY MASS INDEX: 34.36 KG/M2 | DIASTOLIC BLOOD PRESSURE: 69 MMHG | OXYGEN SATURATION: 97 % | SYSTOLIC BLOOD PRESSURE: 139 MMHG | HEIGHT: 69 IN | HEART RATE: 71 BPM

## 2025-04-22 DIAGNOSIS — Z12.11 ENCOUNTER FOR SCREENING FOR MALIGNANT NEOPLASM OF COLON: ICD-10-CM

## 2025-04-22 PROCEDURE — 88305 TISSUE EXAM BY PATHOLOGIST: CPT | Performed by: PATHOLOGY

## 2025-04-22 PROCEDURE — 88360 TUMOR IMMUNOHISTOCHEM/MANUAL: CPT | Performed by: PATHOLOGY

## 2025-04-22 RX ORDER — PROPOFOL 10 MG/ML
INJECTION, EMULSION INTRAVENOUS AS NEEDED
Status: DISCONTINUED | OUTPATIENT
Start: 2025-04-22 | End: 2025-04-22

## 2025-04-22 RX ORDER — LIDOCAINE HYDROCHLORIDE 10 MG/ML
INJECTION, SOLUTION EPIDURAL; INFILTRATION; INTRACAUDAL; PERINEURAL AS NEEDED
Status: DISCONTINUED | OUTPATIENT
Start: 2025-04-22 | End: 2025-04-22

## 2025-04-22 RX ORDER — LABETALOL HYDROCHLORIDE 5 MG/ML
INJECTION, SOLUTION INTRAVENOUS AS NEEDED
Status: DISCONTINUED | OUTPATIENT
Start: 2025-04-22 | End: 2025-04-22

## 2025-04-22 RX ORDER — SODIUM CHLORIDE, SODIUM LACTATE, POTASSIUM CHLORIDE, CALCIUM CHLORIDE 600; 310; 30; 20 MG/100ML; MG/100ML; MG/100ML; MG/100ML
INJECTION, SOLUTION INTRAVENOUS CONTINUOUS PRN
Status: DISCONTINUED | OUTPATIENT
Start: 2025-04-22 | End: 2025-04-22

## 2025-04-22 RX ADMIN — Medication 40 MG: at 11:43

## 2025-04-22 RX ADMIN — LABETALOL HYDROCHLORIDE 5 MG: 5 INJECTION, SOLUTION INTRAVENOUS at 11:56

## 2025-04-22 RX ADMIN — PROPOFOL 100 MG: 10 INJECTION, EMULSION INTRAVENOUS at 11:41

## 2025-04-22 RX ADMIN — LIDOCAINE HYDROCHLORIDE 50 MG: 10 INJECTION, SOLUTION EPIDURAL; INFILTRATION; INTRACAUDAL at 11:41

## 2025-04-22 RX ADMIN — LABETALOL HYDROCHLORIDE 5 MG: 5 INJECTION, SOLUTION INTRAVENOUS at 11:59

## 2025-04-22 RX ADMIN — SODIUM CHLORIDE, SODIUM LACTATE, POTASSIUM CHLORIDE, AND CALCIUM CHLORIDE: .6; .31; .03; .02 INJECTION, SOLUTION INTRAVENOUS at 11:38

## 2025-04-22 RX ADMIN — PROPOFOL 150 MCG/KG/MIN: 10 INJECTION, EMULSION INTRAVENOUS at 11:42

## 2025-04-22 RX ADMIN — PROPOFOL 100 MG: 10 INJECTION, EMULSION INTRAVENOUS at 11:45

## 2025-04-22 NOTE — ANESTHESIA POSTPROCEDURE EVALUATION
Post-Op Assessment Note    CV Status:  Stable  Pain Score: 0    Pain management: adequate       Mental Status:  Alert and awake   Hydration Status:  Stable   PONV Controlled:  Controlled   Airway Patency:  Patent     Post Op Vitals Reviewed: Yes    No anethesia notable event occurred.    Staff: CRNA           Last Filed PACU Vitals:  Vitals Value Taken Time   Temp     Pulse 65    /84 04/22/25 1212   Resp 22    SpO2 99    Vitals shown include unfiled device data.

## 2025-04-22 NOTE — ANESTHESIA PREPROCEDURE EVALUATION
Procedure:  COLONOSCOPY    Relevant Problems   CARDIO   (+) Hypertension      NEURO/PSYCH   (+) Anxiety      PULMONARY   (+) Sleep apnea        Physical Exam    Airway    Mallampati score: II  TM Distance: >3 FB  Neck ROM: full     Dental   No notable dental hx     Cardiovascular  Cardiovascular exam normal    Pulmonary  Pulmonary exam normal     Other Findings  post-pubertal.      Anesthesia Plan  ASA Score- 2     Anesthesia Type- IV sedation with anesthesia with ASA Monitors.         Additional Monitors:     Airway Plan:            Plan Factors-Exercise tolerance (METS): >4 METS.    Chart reviewed.  Imaging results reviewed. Existing labs reviewed. Patient summary reviewed.    Patient is not a current smoker.      Obstructive sleep apnea risk education given perioperatively.        Induction- intravenous.    Postoperative Plan-     Perioperative Resuscitation Plan - Level 1 - Full Code.       Informed Consent- Anesthetic plan and risks discussed with patient.  I personally reviewed this patient with the CRNA. Discussed and agreed on the Anesthesia Plan with the CRNA..      NPO Status:  Vitals Value Taken Time   Date of last liquid 04/22/25 04/22/25 1044   Time of last liquid 0530 04/22/25 1044   Date of last solid 04/20/25 04/22/25 1044   Time of last solid 2345 04/22/25 1044

## 2025-04-22 NOTE — H&P
Procedure(s):  Colonoscopy with indication(s) of CRC screening and polyp surveillance        Vitals:    04/22/25 1105   BP: 138/81   Pulse: 75   Resp: 20   Temp: 97.7 °F (36.5 °C)   SpO2: 97%       Physical Exam:  Physical Exam  Eyes:      Conjunctiva/sclera: Conjunctivae normal.   Cardiovascular:      Rate and Rhythm: Normal rate.   Pulmonary:      Effort: Pulmonary effort is normal.   Abdominal:      Palpations: Abdomen is soft.   Neurological:      General: No focal deficit present.      Mental Status: She is alert.   Psychiatric:         Mood and Affect: Mood normal.              Endoscopy Pre-Procedure Assessment:  Prior to the procedure, the patient is identified.  The patient's history, medications, and allergies have been reviewed.  The patient is competent.     Consent:    We have discussed the procedure in detail. We reviewed risks, benefits and alternative as well as potentional complications including and not limited to missed lesion or polyp,medication side effect , infection, bleeding, perforation and the potential need for surgery, ICU admission, CPR, as well as the need for blood product transfusion. Patient verbalized understanding and agreement. All patient questions were answered.     After reviewed the risks and benefits, the patient is deemed in satisfactory condition to undergo the procedure.  The anesthesia plan is to use monitored anesthesia care (MAC).      04/22/25

## 2025-04-25 PROCEDURE — 88360 TUMOR IMMUNOHISTOCHEM/MANUAL: CPT | Performed by: PATHOLOGY

## 2025-04-25 PROCEDURE — 88305 TISSUE EXAM BY PATHOLOGIST: CPT | Performed by: PATHOLOGY
